# Patient Record
Sex: MALE | Race: WHITE | NOT HISPANIC OR LATINO
[De-identification: names, ages, dates, MRNs, and addresses within clinical notes are randomized per-mention and may not be internally consistent; named-entity substitution may affect disease eponyms.]

---

## 2021-06-08 PROBLEM — Z00.00 ENCOUNTER FOR PREVENTIVE HEALTH EXAMINATION: Status: ACTIVE | Noted: 2021-06-08

## 2021-06-17 ENCOUNTER — APPOINTMENT (OUTPATIENT)
Dept: HEPATOLOGY | Facility: CLINIC | Age: 66
End: 2021-06-17
Payer: COMMERCIAL

## 2021-06-17 DIAGNOSIS — K76.0 FATTY (CHANGE OF) LIVER, NOT ELSEWHERE CLASSIFIED: ICD-10-CM

## 2021-06-17 PROCEDURE — 91200 LIVER ELASTOGRAPHY: CPT

## 2021-06-17 PROCEDURE — 99072 ADDL SUPL MATRL&STAF TM PHE: CPT

## 2021-06-18 PROBLEM — K76.0 FATTY LIVER: Status: ACTIVE | Noted: 2021-06-18

## 2022-12-28 ENCOUNTER — APPOINTMENT (OUTPATIENT)
Dept: ORTHOPEDIC SURGERY | Facility: CLINIC | Age: 67
End: 2022-12-28

## 2024-07-27 ENCOUNTER — APPOINTMENT (OUTPATIENT)
Dept: INTERNAL MEDICINE | Facility: CLINIC | Age: 69
End: 2024-07-27
Payer: COMMERCIAL

## 2024-07-27 ENCOUNTER — NON-APPOINTMENT (OUTPATIENT)
Age: 69
End: 2024-07-27

## 2024-07-27 VITALS
OXYGEN SATURATION: 97 % | WEIGHT: 224 LBS | BODY MASS INDEX: 28.44 KG/M2 | RESPIRATION RATE: 12 BRPM | HEIGHT: 74.41 IN | HEART RATE: 80 BPM | TEMPERATURE: 97.6 F | SYSTOLIC BLOOD PRESSURE: 145 MMHG | DIASTOLIC BLOOD PRESSURE: 90 MMHG

## 2024-07-27 DIAGNOSIS — K76.0 FATTY (CHANGE OF) LIVER, NOT ELSEWHERE CLASSIFIED: ICD-10-CM

## 2024-07-27 DIAGNOSIS — E55.9 VITAMIN D DEFICIENCY, UNSPECIFIED: ICD-10-CM

## 2024-07-27 DIAGNOSIS — Z00.00 ENCOUNTER FOR GENERAL ADULT MEDICAL EXAMINATION W/OUT ABNORMAL FINDINGS: ICD-10-CM

## 2024-07-27 DIAGNOSIS — E78.5 HYPERLIPIDEMIA, UNSPECIFIED: ICD-10-CM

## 2024-07-27 PROCEDURE — G0439: CPT

## 2024-07-27 PROCEDURE — 93000 ELECTROCARDIOGRAM COMPLETE: CPT | Mod: 59

## 2024-07-28 PROBLEM — E55.9 VITAMIN D DEFICIENCY: Status: ACTIVE | Noted: 2024-07-28

## 2024-07-28 NOTE — PHYSICAL EXAM
[No Acute Distress] : no acute distress [Well Nourished] : well nourished [Well Developed] : well developed [Well-Appearing] : well-appearing [Normal Sclera/Conjunctiva] : normal sclera/conjunctiva [PERRL] : pupils equal round and reactive to light [EOMI] : extraocular movements intact [Normal Outer Ear/Nose] : the outer ears and nose were normal in appearance [Normal Oropharynx] : the oropharynx was normal [No JVD] : no jugular venous distention [No Lymphadenopathy] : no lymphadenopathy [Supple] : supple [Thyroid Normal, No Nodules] : the thyroid was normal and there were no nodules present [No Respiratory Distress] : no respiratory distress  [No Accessory Muscle Use] : no accessory muscle use [Clear to Auscultation] : lungs were clear to auscultation bilaterally [Normal Rate] : normal rate  [Regular Rhythm] : with a regular rhythm [Normal S1, S2] : normal S1 and S2 [No Murmur] : no murmur heard [No Carotid Bruits] : no carotid bruits [No Abdominal Bruit] : a ~M bruit was not heard ~T in the abdomen [No Varicosities] : no varicosities [Pedal Pulses Present] : the pedal pulses are present [No Edema] : there was no peripheral edema [No Palpable Aorta] : no palpable aorta [No Extremity Clubbing/Cyanosis] : no extremity clubbing/cyanosis [Soft] : abdomen soft [Non Tender] : non-tender [Non-distended] : non-distended [No Masses] : no abdominal mass palpated [No HSM] : no HSM [Normal Bowel Sounds] : normal bowel sounds [Normal Posterior Cervical Nodes] : no posterior cervical lymphadenopathy [Normal Anterior Cervical Nodes] : no anterior cervical lymphadenopathy [No CVA Tenderness] : no CVA  tenderness [No Spinal Tenderness] : no spinal tenderness [No Joint Swelling] : no joint swelling [Grossly Normal Strength/Tone] : grossly normal strength/tone [No Rash] : no rash [Coordination Grossly Intact] : coordination grossly intact [No Focal Deficits] : no focal deficits [Normal Gait] : normal gait [Deep Tendon Reflexes (DTR)] : deep tendon reflexes were 2+ and symmetric [Normal Affect] : the affect was normal [Normal Insight/Judgement] : insight and judgment were intact [de-identified] : Normotensive   BMI 28.44

## 2024-07-28 NOTE — HISTORY OF PRESENT ILLNESS
[FreeTextEntry1] : Annual Wellness examination [de-identified] : 69 yrs old male here for annual wellness examination. No major complaints today. Patient was referred on prior appointment to urology for rising PSA level from  1.4 () to 2.66 in 23.  Patient saw urology last year and was told it could be elevated from sex the day prior and should be repeated. Patient states he did not want to repeat at that time but agrees to check today. Denies urinary frequency or nocturia, no urgency/dysuria.   Other complaint is wife told him to do a sleep study last year because he was waking up frequently and seemed to have breathing difficulties. Patient states now sleeps better 8 hours per day and did not go for sleep study.  PSH:  Cholecystectomy PMH: hyperlipidemia, vitamin D deficiency, fatty liver. Allergies: NKDA Medication : none-stopped Crestor about 1 mos after starting- states he doesn't feel he needs to take.   Vitamin/supplements: None Vaccines: Annual flu shot SH: nonsmoker, ETOH 3 drinks every 3 days.  No marijuana or drug use.  No vaping or ecigs  FH: Father  55 MI   Mother : 90 old age Diet: Regular Exercise: Walks dog - often Running - knee pain occasion  Preventive screening:  Urology:  - For PSA rising from 1.4 to 2.66- needs follow-up PSA level now.   CRC screenin2021: 2 polyps 4 mm/7 mm - tubular adenoma and hyperplastic polyp  Ophthalmology: Annual -good  Labwork: today  Cardiac: 2018 Carotids, Echo and Aortic doppler were normal- repeat cardiology due.  Need for lung cancer screening/smoker 20 pack yrs/50+/smoker within 15 yrs: Neg  Dental: Q 6mos 2 mos ago had full implants  Dermatology: 1 yrs ago    Fall risk: No  Advanced directives: No  Bone density: Not at risk  PHQ-2: No depression

## 2024-07-28 NOTE — PLAN
[FreeTextEntry1] : 69 years old male here for annual wellness examination.  He is active and generally feels well. He was considering a sleep study last year because his wife mentioned to him she heard him having some difficulty breathing and waking up at night.  He did not go and states sleeping better now. Recommend: Sleep study. Discussed with patient that his symptoms can still mean SHAHAB is active.  Patient will consider. Prior rising PSA from 1.4-2.66.  Patient did see urologist who recommended repeat PSA Recommend PSA level and full lab work including CBC, CMP, lipid, TFTs, LFTs, A1c, magnesium, CK, urine screen, vitamin levels. Recommend regular  follow-up Recommend annual routine cardiology appointments due to family history.  Patient will consider and would like to check carotid Dopplers today.  Also discussed calcium score. Medication reconciliation done.  Discussed the benefit of taking statin for elevated cholesterol due to strong FH and very high cholesterol readings.  I discussed medical studies done showing reduced rates of MI/stroke etc on statins. He will consider, depending on lipid readings today. Patient is active walks his dog several times a day, and eats regular diet.  Recommend low-cholesterol diet. RV 6 months Patient understands instructions and agrees with plan Will contact patient with results of all testing

## 2024-07-28 NOTE — PHYSICAL EXAM
[No Acute Distress] : no acute distress [Well Nourished] : well nourished [Well Developed] : well developed [Well-Appearing] : well-appearing [Normal Sclera/Conjunctiva] : normal sclera/conjunctiva [PERRL] : pupils equal round and reactive to light [EOMI] : extraocular movements intact [Normal Outer Ear/Nose] : the outer ears and nose were normal in appearance [Normal Oropharynx] : the oropharynx was normal [No JVD] : no jugular venous distention [No Lymphadenopathy] : no lymphadenopathy [Supple] : supple [Thyroid Normal, No Nodules] : the thyroid was normal and there were no nodules present [No Respiratory Distress] : no respiratory distress  [No Accessory Muscle Use] : no accessory muscle use [Clear to Auscultation] : lungs were clear to auscultation bilaterally [Normal Rate] : normal rate  [Regular Rhythm] : with a regular rhythm [Normal S1, S2] : normal S1 and S2 [No Murmur] : no murmur heard [No Carotid Bruits] : no carotid bruits [No Abdominal Bruit] : a ~M bruit was not heard ~T in the abdomen [No Varicosities] : no varicosities [Pedal Pulses Present] : the pedal pulses are present [No Edema] : there was no peripheral edema [No Palpable Aorta] : no palpable aorta [No Extremity Clubbing/Cyanosis] : no extremity clubbing/cyanosis [Soft] : abdomen soft [Non Tender] : non-tender [Non-distended] : non-distended [No Masses] : no abdominal mass palpated [No HSM] : no HSM [Normal Bowel Sounds] : normal bowel sounds [Normal Posterior Cervical Nodes] : no posterior cervical lymphadenopathy [Normal Anterior Cervical Nodes] : no anterior cervical lymphadenopathy [No CVA Tenderness] : no CVA  tenderness [No Spinal Tenderness] : no spinal tenderness [No Joint Swelling] : no joint swelling [Grossly Normal Strength/Tone] : grossly normal strength/tone [No Rash] : no rash [Coordination Grossly Intact] : coordination grossly intact [No Focal Deficits] : no focal deficits [Normal Gait] : normal gait [Deep Tendon Reflexes (DTR)] : deep tendon reflexes were 2+ and symmetric [Normal Affect] : the affect was normal [Normal Insight/Judgement] : insight and judgment were intact [de-identified] : Normotensive   BMI 28.44

## 2024-07-28 NOTE — HISTORY OF PRESENT ILLNESS
[FreeTextEntry1] : Annual Wellness examination [de-identified] : 69 yrs old male here for annual wellness examination. No major complaints today. Patient was referred on prior appointment to urology for rising PSA level from  1.4 () to 2.66 in 23.  Patient saw urology last year and was told it could be elevated from sex the day prior and should be repeated. Patient states he did not want to repeat at that time but agrees to check today. Denies urinary frequency or nocturia, no urgency/dysuria.   Other complaint is wife told him to do a sleep study last year because he was waking up frequently and seemed to have breathing difficulties. Patient states now sleeps better 8 hours per day and did not go for sleep study.  PSH:  Cholecystectomy PMH: hyperlipidemia, vitamin D deficiency, fatty liver. Allergies: NKDA Medication : none-stopped Crestor about 1 mos after starting- states he doesn't feel he needs to take.   Vitamin/supplements: None Vaccines: Annual flu shot SH: nonsmoker, ETOH 3 drinks every 3 days.  No marijuana or drug use.  No vaping or ecigs  FH: Father  55 MI   Mother : 90 old age Diet: Regular Exercise: Walks dog - often Running - knee pain occasion  Preventive screening:  Urology:  - For PSA rising from 1.4 to 2.66- needs follow-up PSA level now.   CRC screenin2021: 2 polyps 4 mm/7 mm - tubular adenoma and hyperplastic polyp  Ophthalmology: Annual -good  Labwork: today  Cardiac: 2018 Carotids, Echo and Aortic doppler were normal- repeat cardiology due.  Need for lung cancer screening/smoker 20 pack yrs/50+/smoker within 15 yrs: Neg  Dental: Q 6mos 2 mos ago had full implants  Dermatology: 1 yrs ago    Fall risk: No  Advanced directives: No  Bone density: Not at risk  PHQ-2: No depression

## 2024-07-29 LAB
25(OH)D3 SERPL-MCNC: 25 NG/ML
ALBUMIN SERPL ELPH-MCNC: 4.7 G/DL
ALP BLD-CCNC: 55 U/L
ALT SERPL-CCNC: 34 U/L
ANION GAP SERPL CALC-SCNC: 12 MMOL/L
APPEARANCE: CLEAR
AST SERPL-CCNC: 20 U/L
BASOPHILS # BLD AUTO: 0.03 K/UL
BASOPHILS NFR BLD AUTO: 0.5 %
BILIRUB SERPL-MCNC: 0.8 MG/DL
BILIRUBIN URINE: NEGATIVE
BLOOD URINE: NEGATIVE
BUN SERPL-MCNC: 13 MG/DL
CALCIUM SERPL-MCNC: 9.3 MG/DL
CHLORIDE SERPL-SCNC: 103 MMOL/L
CHOLEST SERPL-MCNC: 266 MG/DL
CK SERPL-CCNC: 118 U/L
CO2 SERPL-SCNC: 23 MMOL/L
COLOR: YELLOW
CREAT SERPL-MCNC: 1.15 MG/DL
CREAT SPEC-SCNC: 189 MG/DL
CRP SERPL-MCNC: <3 MG/L
EGFR: 70 ML/MIN/1.73M2
EOSINOPHIL # BLD AUTO: 0.19 K/UL
EOSINOPHIL NFR BLD AUTO: 3.3 %
ERYTHROCYTE [SEDIMENTATION RATE] IN BLOOD BY WESTERGREN METHOD: 2 MM/HR
ESTIMATED AVERAGE GLUCOSE: 105 MG/DL
FERRITIN SERPL-MCNC: 215 NG/ML
FOLATE SERPL-MCNC: 7.2 NG/ML
GGT SERPL-CCNC: 50 U/L
GLUCOSE QUALITATIVE U: NEGATIVE MG/DL
GLUCOSE SERPL-MCNC: 96 MG/DL
HBA1C MFR BLD HPLC: 5.3 %
HCT VFR BLD CALC: 47.3 %
HDLC SERPL-MCNC: 49 MG/DL
HGB BLD-MCNC: 16.2 G/DL
IMM GRANULOCYTES NFR BLD AUTO: 0.2 %
IRON SATN MFR SERPL: 47 %
IRON SERPL-MCNC: 148 UG/DL
KETONES URINE: NEGATIVE MG/DL
LDLC SERPL CALC-MCNC: 185 MG/DL
LEUKOCYTE ESTERASE URINE: NEGATIVE
LYMPHOCYTES # BLD AUTO: 1.68 K/UL
LYMPHOCYTES NFR BLD AUTO: 28.8 %
MAGNESIUM SERPL-MCNC: 2 MG/DL
MAN DIFF?: NORMAL
MCHC RBC-ENTMCNC: 31.5 PG
MCHC RBC-ENTMCNC: 34.2 GM/DL
MCV RBC AUTO: 92 FL
MICROALBUMIN 24H UR DL<=1MG/L-MCNC: <1.2 MG/DL
MICROALBUMIN/CREAT 24H UR-RTO: NORMAL MG/G
MONOCYTES # BLD AUTO: 0.47 K/UL
MONOCYTES NFR BLD AUTO: 8.1 %
NEUTROPHILS # BLD AUTO: 3.45 K/UL
NEUTROPHILS NFR BLD AUTO: 59.1 %
NITRITE URINE: NEGATIVE
NONHDLC SERPL-MCNC: 216 MG/DL
PH URINE: 5.5
PLATELET # BLD AUTO: 197 K/UL
POTASSIUM SERPL-SCNC: 4.5 MMOL/L
PROT SERPL-MCNC: 7 G/DL
PROTEIN URINE: NEGATIVE MG/DL
PSA SERPL-MCNC: 1.94 NG/ML
RBC # BLD: 5.14 M/UL
RBC # FLD: 12.6 %
SODIUM SERPL-SCNC: 139 MMOL/L
SPECIFIC GRAVITY URINE: 1.02
T4 FREE SERPL-MCNC: 1.4 NG/DL
TIBC SERPL-MCNC: 312 UG/DL
TRIGL SERPL-MCNC: 169 MG/DL
TSH SERPL-ACNC: 1.24 UIU/ML
UIBC SERPL-MCNC: 165 UG/DL
UROBILINOGEN URINE: 0.2 MG/DL
VIT B12 SERPL-MCNC: 541 PG/ML
WBC # FLD AUTO: 5.83 K/UL

## 2024-08-13 RX ORDER — ROSUVASTATIN CALCIUM 5 MG/1
5 TABLET, FILM COATED ORAL DAILY
Qty: 90 | Refills: 1 | Status: ACTIVE | COMMUNITY
Start: 2024-08-13 | End: 1900-01-01

## 2025-01-23 ENCOUNTER — APPOINTMENT (OUTPATIENT)
Dept: ORTHOPEDIC SURGERY | Facility: CLINIC | Age: 70
End: 2025-01-23
Payer: COMMERCIAL

## 2025-01-23 VITALS — HEIGHT: 75 IN | BODY MASS INDEX: 27.35 KG/M2 | WEIGHT: 220 LBS

## 2025-01-23 DIAGNOSIS — M12.811 OTHER SPECIFIC ARTHROPATHIES, NOT ELSEWHERE CLASSIFIED, RIGHT SHOULDER: ICD-10-CM

## 2025-01-23 DIAGNOSIS — M50.320 OTHER CERVICAL DISC DEGENERATION, MID-CERVICAL REGION, UNSPECIFIED LEVEL: ICD-10-CM

## 2025-01-23 DIAGNOSIS — M54.12 RADICULOPATHY, CERVICAL REGION: ICD-10-CM

## 2025-01-23 DIAGNOSIS — M25.511 PAIN IN RIGHT SHOULDER: ICD-10-CM

## 2025-01-23 PROCEDURE — 72040 X-RAY EXAM NECK SPINE 2-3 VW: CPT

## 2025-01-23 PROCEDURE — 99204 OFFICE O/P NEW MOD 45 MIN: CPT

## 2025-01-23 PROCEDURE — 73030 X-RAY EXAM OF SHOULDER: CPT | Mod: RT

## 2025-01-23 RX ORDER — METHYLPREDNISOLONE 4 MG/1
4 TABLET ORAL
Qty: 1 | Refills: 0 | Status: ACTIVE | COMMUNITY
Start: 2025-01-23 | End: 1900-01-01

## 2025-02-01 ENCOUNTER — APPOINTMENT (OUTPATIENT)
Dept: INTERNAL MEDICINE | Facility: CLINIC | Age: 70
End: 2025-02-01

## 2025-02-25 ENCOUNTER — APPOINTMENT (OUTPATIENT)
Dept: ORTHOPEDIC SURGERY | Facility: CLINIC | Age: 70
End: 2025-02-25